# Patient Record
Sex: FEMALE | Race: WHITE | NOT HISPANIC OR LATINO | Employment: OTHER | URBAN - METROPOLITAN AREA
[De-identification: names, ages, dates, MRNs, and addresses within clinical notes are randomized per-mention and may not be internally consistent; named-entity substitution may affect disease eponyms.]

---

## 2017-10-27 ENCOUNTER — OFFICE VISIT (OUTPATIENT)
Dept: OBSTETRICS AND GYNECOLOGY | Facility: CLINIC | Age: 67
End: 2017-10-27

## 2017-10-27 VITALS
DIASTOLIC BLOOD PRESSURE: 92 MMHG | HEIGHT: 61 IN | SYSTOLIC BLOOD PRESSURE: 152 MMHG | HEART RATE: 96 BPM | BODY MASS INDEX: 32.28 KG/M2 | WEIGHT: 171 LBS

## 2017-10-27 DIAGNOSIS — Z01.419 ENCOUNTER FOR ROUTINE GYNECOLOGIC EXAMINATION IN MEDICARE PATIENT: Primary | ICD-10-CM

## 2017-10-27 DIAGNOSIS — Z12.4 SCREENING FOR CERVICAL CANCER: ICD-10-CM

## 2017-10-27 PROCEDURE — 99387 INIT PM E/M NEW PAT 65+ YRS: CPT | Performed by: OBSTETRICS & GYNECOLOGY

## 2017-10-27 RX ORDER — LISINOPRIL 10 MG/1
10 TABLET ORAL DAILY
COMMUNITY

## 2017-10-27 NOTE — PROGRESS NOTES
Subjective   Flores Perdomo is a 67 y.o. female   CC: Pt here for annual.  History of Present Illness  Pt here for annual.  She is without gynecologic complaints today.  Patient is menopausal.  Her last menses was around age 49 or 50.  She never took hormone replacement therapy after menopause.  She denies any bleeding or vaginal discharge at this time.  She reports that her last Pap smear was about 2010 and was normal.  She denies any history of abnormal Pap smears or sexually transmitted diseases.  Patient reports that her last colonoscopy was over 10 years ago.  She has also been instructed by her primary care physician but it is time for her next colonoscopy, and she is aware of this.  She had a mammogram about 3 weeks ago that was normal.  Her last bone density scan was about 4 years ago and was normal.  She does take a daily multivitamin with vitamin D and calcium.  She is not a smoker.  She performs self breast exams regularly.    Past Medical History:   Diagnosis Date   • Hypertension      Past Surgical History:   Procedure Laterality Date   • APPENDECTOMY       Family History   Problem Relation Age of Onset   • Prostate cancer Father    • Hypertension Father    • Hypertension Mother      Social History   Substance Use Topics   • Smoking status: Former Smoker   • Smokeless tobacco: Never Used   • Alcohol use No       Current Outpatient Prescriptions:   •  lisinopril (PRINIVIL,ZESTRIL) 10 MG tablet, Take 10 mg by mouth Daily., Disp: , Rfl:    MVI daily    No Known Allergies    Review of Systems  General: No fever or chills  Constitutional: No weight loss or gain, no hair loss  HENT: No headache, no hearing loss, no tinnitus  Eyes: normal vision, no eye pain  Lungs: No cough, no shortness of breath  Heart: No chest pain, no palpitations  Abdomen: No nausea, vomiting, constipation or diarrhea  : No dysuria, no hematuria  Skin: No rashes  Lymph: No swelling  Neuro: No parathesia, no weakness  Psych: Normal  "though content, no hallucinations, no SI/HI    Objective   Physical Exam  Vitals:    10/27/17 1019   BP: 152/92   Pulse: 96   Weight: 171 lb (77.6 kg)   Height: 61\" (154.9 cm)   Gen: No acute distress, awake and oriented times three  HENT: Normocephalic, atraumatic, Moist mucous membranes  Eyes: PERRLA, EOMI  Neck: Supple, normal range of motion, no thyromegaly  Lungs: Normal work of breathing, lungs clear bilaterally, no crackles/wheezes  Heart: Regular rate and rhythm, no murmurs  Abdomen: soft, nontender, non distended, normoactive bowel sounds  Breast: Symmetrical. No skin changes or nipple retractions. No lumps or masses bilaterally. No tenderness bilaterally.  Pelvic:   Normal external female genitalia, no lesions  Vagina: No blood or discharge, atrophic changes noted  Cervix: No cervical motion tenderness, no lesions, no active bleeding, nonfriable  Uterus: Anteverted, normal size and shape, nontender  Adnexa: No masses or tenderness  Rectal: Deferred  Skin: Warm and dry, no rashes  Psych: Good judgement and insight, normal affect and mood  Neuro: CN 2-12 intact, no gross deficits    Assessment/Plan   Diagnoses and all orders for this visit:    Encounter for routine gynecologic examination in Medicare patient  -     IGP, Apt HPV,rfx 16 / 18,45 - ThinPrep Vial, Cervix    Screening for cervical cancer  -     IGP, Apt HPV,rfx 16 / 18,45 - ThinPrep Vial, Cervix    Pap smear performed today.  She is instructed to call our office for the results if she has not heard them after 1 week. Continue regular self breast exams.  Continue mammograms every 1-2 years.  I would recommend the patient have a DEXA scan next year.  She is due for colonoscopy as well.  If she has not had any complications other plan to see the patient back in 2 years for her annual exam, or sooner as needed.               "

## 2017-11-01 LAB
CYTOLOGIST CVX/VAG CYTO: NORMAL
CYTOLOGY CVX/VAG DOC THIN PREP: NORMAL
DX ICD CODE: NORMAL
HIV 1 & 2 AB SER-IMP: NORMAL
HPV I/H RISK 4 DNA CVX QL PROBE+SIG AMP: NEGATIVE
OTHER STN SPEC: NORMAL
PATH REPORT.FINAL DX SPEC: NORMAL
STAT OF ADQ CVX/VAG CYTO-IMP: NORMAL

## 2017-11-08 ENCOUNTER — TELEPHONE (OUTPATIENT)
Dept: OBSTETRICS AND GYNECOLOGY | Facility: CLINIC | Age: 67
End: 2017-11-08

## 2017-11-08 NOTE — TELEPHONE ENCOUNTER
----- Message from Khalif Lee MD sent at 11/6/2017 11:32 PM EST -----  Let the patient know that her Pap smear was normal

## 2021-04-02 ENCOUNTER — IMMUNIZATION (OUTPATIENT)
Dept: VACCINE CLINIC | Facility: HOSPITAL | Age: 71
End: 2021-04-02

## 2021-04-02 PROCEDURE — 0001A: CPT | Performed by: INTERNAL MEDICINE

## 2021-04-02 PROCEDURE — 91300 HC SARSCOV02 VAC 30MCG/0.3ML IM: CPT | Performed by: INTERNAL MEDICINE

## 2021-04-23 ENCOUNTER — IMMUNIZATION (OUTPATIENT)
Dept: VACCINE CLINIC | Facility: HOSPITAL | Age: 71
End: 2021-04-23

## 2021-04-23 PROCEDURE — 0002A: CPT | Performed by: INTERNAL MEDICINE

## 2021-04-23 PROCEDURE — 91300 HC SARSCOV02 VAC 30MCG/0.3ML IM: CPT | Performed by: INTERNAL MEDICINE

## 2021-10-29 ENCOUNTER — IMMUNIZATION (OUTPATIENT)
Dept: VACCINE CLINIC | Facility: HOSPITAL | Age: 71
End: 2021-10-29

## 2021-10-29 PROCEDURE — 0004A HC ADM SARSCOV2 30MCG/0.3ML BOOSTER: CPT | Performed by: INTERNAL MEDICINE

## 2021-10-29 PROCEDURE — 0003A: CPT | Performed by: INTERNAL MEDICINE

## 2021-10-29 PROCEDURE — 91300 HC SARSCOV02 VAC 30MCG/0.3ML IM: CPT | Performed by: INTERNAL MEDICINE
